# Patient Record
Sex: MALE | Race: WHITE | NOT HISPANIC OR LATINO | Employment: FULL TIME | URBAN - METROPOLITAN AREA
[De-identification: names, ages, dates, MRNs, and addresses within clinical notes are randomized per-mention and may not be internally consistent; named-entity substitution may affect disease eponyms.]

---

## 2019-05-08 ENCOUNTER — OFFICE VISIT (OUTPATIENT)
Dept: FAMILY MEDICINE CLINIC | Facility: CLINIC | Age: 29
End: 2019-05-08

## 2019-05-08 VITALS
DIASTOLIC BLOOD PRESSURE: 88 MMHG | OXYGEN SATURATION: 99 % | SYSTOLIC BLOOD PRESSURE: 148 MMHG | HEART RATE: 82 BPM | HEIGHT: 72 IN | WEIGHT: 254 LBS | BODY MASS INDEX: 34.4 KG/M2

## 2019-05-08 DIAGNOSIS — M54.50 CHRONIC BILATERAL LOW BACK PAIN WITHOUT SCIATICA: Primary | ICD-10-CM

## 2019-05-08 DIAGNOSIS — M43.06 PARS DEFECT OF LUMBAR SPINE: ICD-10-CM

## 2019-05-08 DIAGNOSIS — G89.29 CHRONIC BILATERAL LOW BACK PAIN WITHOUT SCIATICA: Primary | ICD-10-CM

## 2019-05-08 DIAGNOSIS — K64.9 HEMORRHOIDS, UNSPECIFIED HEMORRHOID TYPE: ICD-10-CM

## 2019-05-08 PROCEDURE — 99203 OFFICE O/P NEW LOW 30 MIN: CPT | Performed by: FAMILY MEDICINE

## 2019-05-08 NOTE — PATIENT INSTRUCTIONS
1.  Core exercises--but only flexion, no back extension exercises. Plakns are ideal.    2.  Blood work has been ordered for you today, to be done about a week prior to your next appointment.  You do not need an appointment, but I would advise to call our office a few days before you plan to have your labs done to confirm that orders are in and active.  We will discuss the results at your next scheduled visit.

## 2019-05-08 NOTE — PROGRESS NOTES
Subjective   Rosas Rich is a 28 y.o. male.     Chief Complaint   Patient presents with   • Establish Care       History of Present Illness     Acute complaints:  Rosas Rich is a 28 y.o. male who presents today to establish care.  He was seen at the urgent care yesterday by Magali Pratt for low back pain.  It has been present for 2 weeks, mostly occurs after stretching.  He denies any injury.  The pain is aching and nonradiating.  It seems to be worsening over the past week.  He was prescribed a Medrol Dosepak yesterday, apart from that had not tried any treatments.  It is been much the same all the time and does not seem to be affected by any particular activity or movement. He played football as a  in .    His blood pressure was significantly elevated 152/97 at his visit yesterday, he had significant pain obviously at the time.  He denies any history of hypertension, although he does report a family history of same.  He has a history of obesity with a BMI maintained over 33.    The following portions of the patient's history were reviewed and updated as appropriate: allergies, current medications, past family history, past medical history, past social history, past surgical history and problem list.    Active Ambulatory Problems     Diagnosis Date Noted   • No Active Ambulatory Problems     Resolved Ambulatory Problems     Diagnosis Date Noted   • No Resolved Ambulatory Problems     No Additional Past Medical History     Past Surgical History:   Procedure Laterality Date   • KNEE ARTHROSCOPY       Family History   Problem Relation Age of Onset   • Hypertension Father    • Heart attack Paternal Grandfather      Social History     Socioeconomic History   • Marital status:      Spouse name: Not on file   • Number of children: Not on file   • Years of education: Not on file   • Highest education level: Not on file   Tobacco Use   • Smoking status: Never Smoker   • Smokeless tobacco: Former User  "  Substance and Sexual Activity   • Alcohol use: Yes     Frequency: Never     Comment: seldom   • Drug use: No   • Sexual activity: Defer         Review of Systems   Constitutional: Negative.    Respiratory: Negative.    Cardiovascular: Negative.    Gastrointestinal: Negative.    Musculoskeletal: Positive for back pain. Negative for gait problem.   Neurological: Negative.        Objective   Blood pressure 148/88, pulse 82, height 182.9 cm (72.01\"), weight 115 kg (254 lb), SpO2 99 %.  Nursing note reviewed  Physical Exam   Constitutional: He appears well-developed and well-nourished.   HENT:   Head: Normocephalic and atraumatic.   Eyes: EOM are normal. Pupils are equal, round, and reactive to light.   Neck: Normal range of motion. Neck supple.   Abdominal: Soft. Bowel sounds are normal. He exhibits no distension.   Musculoskeletal:        Lumbar back: He exhibits decreased range of motion, tenderness and spasm. He exhibits no bony tenderness, no swelling, no edema, no deformity and no laceration.   Nursing note and vitals reviewed.    Procedures  Assessment/Plan   Rosas was seen today for establish care.    Diagnoses and all orders for this visit:    Chronic bilateral low back pain without sciatica  -     MRI Lumbar Spine Without Contrast; Future    Hemorrhoids, unspecified hemorrhoid type    Pars defect of lumbar spine  -     MRI Lumbar Spine Without Contrast; Future        #1  Acute low back pain  Stretches reviewed with the patient, I would recommend physical therapy focusing on flexion- likely pars defect chronic spondy  - MRI ordered today  He should finish his Medrol Dosepak  Weight loss will help his overall mechanics as well routine core exercise and strengthening    #2  Elevated blood pressure  Would recommend observation once he is out of acute pain    #3  Obesity  Lifelong, recommend significant dietary changes and exercise, at least 10,000 steps per day    We will plan to obtain previous records both " for chronic preventative care as well as those related to the current episode of care.  Any records that the patient brought with him today were reviewed personally by me during the visit today and will be scanned into the chart for posterity.    Patient Instructions   1.  Core exercises--but only flexion, no back extension exercises. Plakns are ideal.    2.  Blood work has been ordered for you today, to be done about a week prior to your next appointment.  You do not need an appointment, but I would advise to call our office a few days before you plan to have your labs done to confirm that orders are in and active.  We will discuss the results at your next scheduled visit.      Return in about 6 months (around 11/8/2019) for Annual, (fasting blood work 1 week prior to appt).    Ambulatory progress note signed and attested to by Sanchez Gamino D.O. on 5/15/2019 at 07:32.

## 2019-05-15 ENCOUNTER — TELEPHONE (OUTPATIENT)
Dept: FAMILY MEDICINE CLINIC | Facility: CLINIC | Age: 29
End: 2019-05-15

## 2019-05-15 PROBLEM — G89.29 CHRONIC BILATERAL LOW BACK PAIN WITHOUT SCIATICA: Status: ACTIVE | Noted: 2019-05-15

## 2019-05-15 PROBLEM — K64.9 HEMORRHOIDS: Status: ACTIVE | Noted: 2019-05-15

## 2019-05-15 PROBLEM — M54.50 CHRONIC BILATERAL LOW BACK PAIN WITHOUT SCIATICA: Status: ACTIVE | Noted: 2019-05-15

## 2019-05-15 PROBLEM — M43.06 PARS DEFECT OF LUMBAR SPINE: Status: ACTIVE | Noted: 2019-05-15

## 2019-05-15 NOTE — TELEPHONE ENCOUNTER
PT IS HAVING PROBLEMS WITH HIS SPINE AND WANTS TO ASK IF THE PROBLEM THAT HE IS HAVING IS NORMAL. WOULD NOT PROVIDE ME WITH ANY DETAILS.

## 2019-10-08 ENCOUNTER — TELEPHONE (OUTPATIENT)
Dept: FAMILY MEDICINE CLINIC | Facility: CLINIC | Age: 29
End: 2019-10-08

## 2019-10-08 NOTE — TELEPHONE ENCOUNTER
Pt called and wants us to know that he is getting his labs done that Stevenson Lopez ordered. Pt is having concerns was told at his last visit in may that he has hemmroids, wanted to make sure that's exactly what they are, what can make them worse, etc. Should he move the MRI up for this concern.

## 2019-10-09 ENCOUNTER — OFFICE VISIT (OUTPATIENT)
Dept: FAMILY MEDICINE CLINIC | Facility: CLINIC | Age: 29
End: 2019-10-09

## 2019-10-09 ENCOUNTER — TELEPHONE (OUTPATIENT)
Dept: FAMILY MEDICINE CLINIC | Facility: CLINIC | Age: 29
End: 2019-10-09

## 2019-10-09 VITALS
WEIGHT: 264 LBS | OXYGEN SATURATION: 98 % | HEIGHT: 72 IN | SYSTOLIC BLOOD PRESSURE: 130 MMHG | BODY MASS INDEX: 35.76 KG/M2 | HEART RATE: 82 BPM | DIASTOLIC BLOOD PRESSURE: 80 MMHG

## 2019-10-09 DIAGNOSIS — K64.9 HEMORRHOIDS, UNSPECIFIED HEMORRHOID TYPE: Primary | ICD-10-CM

## 2019-10-09 DIAGNOSIS — G89.29 CHRONIC BILATERAL LOW BACK PAIN WITHOUT SCIATICA: ICD-10-CM

## 2019-10-09 DIAGNOSIS — M54.50 CHRONIC BILATERAL LOW BACK PAIN WITHOUT SCIATICA: ICD-10-CM

## 2019-10-09 DIAGNOSIS — M43.06 PARS DEFECT OF LUMBAR SPINE: ICD-10-CM

## 2019-10-09 PROCEDURE — 99214 OFFICE O/P EST MOD 30 MIN: CPT | Performed by: FAMILY MEDICINE

## 2019-10-09 RX ORDER — DIAPER,BRIEF,INFANT-TODD,DISP
EACH MISCELLANEOUS 2 TIMES DAILY
Qty: 120 G | Refills: 0 | Status: SHIPPED | OUTPATIENT
Start: 2019-10-09

## 2019-10-09 NOTE — PATIENT INSTRUCTIONS
Hemorrhoids    Hemorrhoids are swollen veins in and around the rectum or anus. Hemorrhoids can cause pain, itching, or bleeding. Most of the time, they do not cause serious problems. They usually get better with diet changes, lifestyle changes, and other home treatments.  Follow these instructions at home:  Eating and drinking  · Eat foods that have fiber, such as whole grains, beans, nuts, fruits, and vegetables. Ask your doctor about taking products that have added fiber (fiber supplements).  · Drink enough fluid to keep your pee (urine) clear or pale yellow.  For Pain and Swelling  · Take a warm-water bath (sitz bath) for 20 minutes to ease pain. Do this 3-4 times a day.  · If directed, put ice on the painful area. It may be helpful to use ice between your warm baths.  ? Put ice in a plastic bag.  ? Place a towel between your skin and the bag.  ? Leave the ice on for 20 minutes, 2-3 times a day.  General instructions  · Take over-the-counter and prescription medicines only as told by your doctor.  ? Medicated creams and medicines that are inserted into the anus (suppositories) may be used or applied as told.  · Exercise often.  · Go to the bathroom when you have the urge to poop (to have a bowel movement). Do not wait.  · Avoid pushing too hard (straining) when you poop.  · Keep the butt area dry and clean. Use wet toilet paper or moist paper towels.  · Do not sit on the toilet for a long time.  Contact a doctor if:  · You have any of these:  ? Pain and swelling that do not get better with treatment or medicine.  ? Bleeding that will not stop.  ? Trouble pooping or you cannot poop.  ? Pain or swelling outside the area of the hemorrhoids.  This information is not intended to replace advice given to you by your health care provider. Make sure you discuss any questions you have with your health care provider.  Document Released: 09/26/2009 Document Revised: 05/25/2017 Document Reviewed: 08/31/2016  [employer]  Interactive Patient Education © 2019 [employer]

## 2019-10-09 NOTE — PROGRESS NOTES
Subjective   Rosas Rich is a 29 y.o. male.     Chief Complaint   Patient presents with   • Follow-up       History of Present Illness     Rosas Rich presents today for   Chief Complaint   Patient presents with   • Follow-up     He was seen in May for back pain.  At that point it is been present for 2 weeks.  It mostly occurs after stretching, but has been intermittent. He denies any injury.  The pain is aching and nonradiating.  It seems to be worsening over the past week.  He was prescribed a Medrol Dosepak yesterday, apart from that had not tried any treatments.  It is been much the same all the time and does not seem to be affected by any particular activity or movement. He played football as a  in .    He has mild hemorrhoids, they respond well to Preparation H wipes, but when he does not use the wipes his symptoms return.    This patient is accompanied by their self who contributes to the history of their care.    The following portions of the patient's history were reviewed and updated as appropriate: allergies, current medications, past family history, past medical history, past social history, past surgical history and problem list.    Active Ambulatory Problems     Diagnosis Date Noted   • Chronic bilateral low back pain without sciatica 05/15/2019   • Hemorrhoids 05/15/2019   • Pars defect of lumbar spine 05/15/2019     Resolved Ambulatory Problems     Diagnosis Date Noted   • No Resolved Ambulatory Problems     No Additional Past Medical History     Past Surgical History:   Procedure Laterality Date   • KNEE ARTHROSCOPY       Family History   Problem Relation Age of Onset   • Hypertension Father    • Heart attack Paternal Grandfather      Social History     Socioeconomic History   • Marital status:      Spouse name: Not on file   • Number of children: Not on file   • Years of education: Not on file   • Highest education level: Not on file   Tobacco Use   • Smoking status: Never  "Smoker   • Smokeless tobacco: Former User   Substance and Sexual Activity   • Alcohol use: Yes     Frequency: Never     Comment: seldom   • Drug use: No   • Sexual activity: Defer         Review of Systems   Constitutional: Negative.    Respiratory: Negative.    Cardiovascular: Negative.    Gastrointestinal: Positive for constipation and rectal pain. Negative for abdominal distention and abdominal pain.       Objective   Blood pressure 130/80, pulse 82, height 182.9 cm (72.01\"), weight 120 kg (264 lb), SpO2 98 %.  Nursing note reviewed  Physical Exam  Const: NAD, A&Ox4, Pleasant, Cooperative  Eyes: EOMI, no conjunctivitis  ENT: No nasal discharge present, neck supple  Cardiac: Regular rate and rhythm, no cyanosis  Resp: Respiratory rate within normal limits, no increased work of breathing, no audible wheezing or retractions noted  GI: No distention or ascites  MSK: Motor and sensation grossly intact in bilateral upper extremities  Neurologic: CN II-XII grossly intact  Psych: Appropriate mood and behavior.  Skin: Pink, warm, dry  Procedures  Assessment/Plan     Problem List Items Addressed This Visit        Cardiovascular and Mediastinum    Hemorrhoids - Primary    Relevant Medications    hydrocortisone 1 % cream    docusate sodium (COLACE) 50 MG capsule    Other Relevant Orders    Ambulatory Referral For Screening Colonoscopy (Completed)       Nervous and Auditory    Chronic bilateral low back pain without sciatica    Relevant Orders    MRI Lumbar Spine Without Contrast       Musculoskeletal and Integument    Pars defect of lumbar spine    Relevant Orders    MRI Lumbar Spine Without Contrast        #1 low back pain  Previous x-ray, he has tried doing stretching at home but his work schedule prohibits this somewhat  He previously had an MRI scheduled, but wanted to hold off on this until after further discussion    #2 hemorrhoids  Classical symptoms, no significant red flags.  I have advised him to use treatment " regularly, particularly improvements in diet, exercise, water intake.  He reports that he is still concerned and his wife asked him to see a colorectal surgeon and have it checked out a referral placed today.    Patient Instructions   Hemorrhoids    Hemorrhoids are swollen veins in and around the rectum or anus. Hemorrhoids can cause pain, itching, or bleeding. Most of the time, they do not cause serious problems. They usually get better with diet changes, lifestyle changes, and other home treatments.  Follow these instructions at home:  Eating and drinking  · Eat foods that have fiber, such as whole grains, beans, nuts, fruits, and vegetables. Ask your doctor about taking products that have added fiber (fiber supplements).  · Drink enough fluid to keep your pee (urine) clear or pale yellow.  For Pain and Swelling  · Take a warm-water bath (sitz bath) for 20 minutes to ease pain. Do this 3-4 times a day.  · If directed, put ice on the painful area. It may be helpful to use ice between your warm baths.  ? Put ice in a plastic bag.  ? Place a towel between your skin and the bag.  ? Leave the ice on for 20 minutes, 2-3 times a day.  General instructions  · Take over-the-counter and prescription medicines only as told by your doctor.  ? Medicated creams and medicines that are inserted into the anus (suppositories) may be used or applied as told.  · Exercise often.  · Go to the bathroom when you have the urge to poop (to have a bowel movement). Do not wait.  · Avoid pushing too hard (straining) when you poop.  · Keep the butt area dry and clean. Use wet toilet paper or moist paper towels.  · Do not sit on the toilet for a long time.  Contact a doctor if:  · You have any of these:  ? Pain and swelling that do not get better with treatment or medicine.  ? Bleeding that will not stop.  ? Trouble pooping or you cannot poop.  ? Pain or swelling outside the area of the hemorrhoids.  This information is not intended to replace  advice given to you by your health care provider. Make sure you discuss any questions you have with your health care provider.  Document Released: 09/26/2009 Document Revised: 05/25/2017 Document Reviewed: 08/31/2016  [employer] Interactive Patient Education © 2019 [employer]        Return for follow up after testing/imaging.    Ambulatory progress note signed and attested to by Sanchez Gamino D.O.

## 2019-10-09 NOTE — TELEPHONE ENCOUNTER
Called and informed pharmacy of new dose. They verbalized understanding and had no further questions.

## 2019-10-11 ENCOUNTER — APPOINTMENT (OUTPATIENT)
Dept: MRI IMAGING | Facility: HOSPITAL | Age: 29
End: 2019-10-11

## 2019-10-16 ENCOUNTER — HOSPITAL ENCOUNTER (OUTPATIENT)
Dept: MRI IMAGING | Facility: HOSPITAL | Age: 29
Discharge: HOME | End: 2019-10-16
Admitting: FAMILY MEDICINE

## 2019-10-16 DIAGNOSIS — G89.29 CHRONIC BILATERAL LOW BACK PAIN WITHOUT SCIATICA: ICD-10-CM

## 2019-10-16 DIAGNOSIS — M43.06 PARS DEFECT OF LUMBAR SPINE: ICD-10-CM

## 2019-10-16 DIAGNOSIS — M54.50 CHRONIC BILATERAL LOW BACK PAIN WITHOUT SCIATICA: ICD-10-CM

## 2019-10-16 PROCEDURE — 72148 MRI LUMBAR SPINE W/O DYE: CPT

## 2019-10-31 ENCOUNTER — OFFICE VISIT (OUTPATIENT)
Dept: FAMILY MEDICINE CLINIC | Facility: CLINIC | Age: 29
End: 2019-10-31

## 2019-10-31 VITALS
DIASTOLIC BLOOD PRESSURE: 80 MMHG | WEIGHT: 256 LBS | SYSTOLIC BLOOD PRESSURE: 132 MMHG | OXYGEN SATURATION: 99 % | HEIGHT: 72 IN | BODY MASS INDEX: 34.67 KG/M2 | HEART RATE: 76 BPM

## 2019-10-31 DIAGNOSIS — M43.06 PARS DEFECT OF LUMBAR SPINE: ICD-10-CM

## 2019-10-31 DIAGNOSIS — M43.10 ANTEROLISTHESIS: ICD-10-CM

## 2019-10-31 DIAGNOSIS — M54.50 CHRONIC BILATERAL LOW BACK PAIN WITHOUT SCIATICA: Primary | ICD-10-CM

## 2019-10-31 DIAGNOSIS — G89.29 CHRONIC BILATERAL LOW BACK PAIN WITHOUT SCIATICA: Primary | ICD-10-CM

## 2019-10-31 PROCEDURE — 99214 OFFICE O/P EST MOD 30 MIN: CPT | Performed by: FAMILY MEDICINE

## 2019-10-31 RX ORDER — MELOXICAM 15 MG/1
15 TABLET ORAL DAILY
Qty: 21 TABLET | Refills: 0 | Status: SHIPPED | OUTPATIENT
Start: 2019-10-31 | End: 2019-11-21

## 2019-11-11 ENCOUNTER — TELEPHONE (OUTPATIENT)
Dept: FAMILY MEDICINE CLINIC | Facility: CLINIC | Age: 29
End: 2019-11-11

## 2019-11-11 NOTE — TELEPHONE ENCOUNTER
[facility] called: needs all of pt's radiology reports sent to them.  Fax [phone]  Phone  [phone]

## 2019-11-25 NOTE — PROGRESS NOTES
Subjective   Rosas Rich is a 29 y.o. male.     Chief Complaint   Patient presents with   • Follow-up     MRI results        History of Present Illness     Rosas Rich presents today for   Chief Complaint   Patient presents with   • Follow-up     MRI results      He is here today to follow-up and discuss his MRI results.  He has chronic bilateral low back pain.  MRI was reviewed with him today at the point of care, showed some slight anterolisthesis,.  His pain has continued, mostly mild, but worsens with activity.    This patient is accompanied by their self who contributes to the history of their care.    The following portions of the patient's history were reviewed and updated as appropriate: allergies, current medications, past family history, past medical history, past social history, past surgical history and problem list.    Active Ambulatory Problems     Diagnosis Date Noted   • Chronic bilateral low back pain without sciatica 05/15/2019   • Hemorrhoids 05/15/2019   • Pars defect of lumbar spine 05/15/2019     Resolved Ambulatory Problems     Diagnosis Date Noted   • No Resolved Ambulatory Problems     No Additional Past Medical History     Past Surgical History:   Procedure Laterality Date   • KNEE ARTHROSCOPY       Family History   Problem Relation Age of Onset   • Hypertension Father    • Heart attack Paternal Grandfather      Social History     Socioeconomic History   • Marital status:      Spouse name: Not on file   • Number of children: Not on file   • Years of education: Not on file   • Highest education level: Not on file   Tobacco Use   • Smoking status: Never Smoker   • Smokeless tobacco: Former User   Substance and Sexual Activity   • Alcohol use: Yes     Frequency: Never     Comment: seldom   • Drug use: No   • Sexual activity: Defer       Review of Systems  Review of Systems -  General ROS: negative for - chills, fever or night sweats  Cardiovascular ROS: no chest pain or dyspnea on  "exertion  Gastrointestinal ROS: no abdominal pain, change in bowel habits, or black or bloody stools  Genito-Urinary ROS: no trouble voiding or gross hematuria    Objective   Blood pressure 132/80, pulse 76, height 182.9 cm (72.01\"), weight 116 kg (256 lb), SpO2 99 %.  Nursing note reviewed  Physical Exam  Const: NAD, A&Ox4, Pleasant, Cooperative  Eyes: EOMI, no conjunctivitis  ENT: No nasal discharge present, neck supple  Cardiac: Regular rate and rhythm, no cyanosis  Resp: Respiratory rate within normal limits, no increased work of breathing, no audible wheezing or retractions noted  GI: No distention or ascites  Neurologic: CN II-XII grossly intact  Psych: Appropriate mood and behavior.  Skin: Warm, dry  Procedures   No radiology results for the last 30 days.  Assessment/Plan     Problem List Items Addressed This Visit        Nervous and Auditory    Chronic bilateral low back pain without sciatica - Primary    Relevant Orders    Ambulatory Referral to Physical Therapy Evaluate and treat       Musculoskeletal and Integument    Pars defect of lumbar spine    Relevant Orders    Ambulatory Referral to Physical Therapy Evaluate and treat      Other Visit Diagnoses     Anterolisthesis        Relevant Orders    Ambulatory Referral to Physical Therapy Evaluate and treat        #1 chronic bilateral low back pain  Referral to physical therapy placed today  Recommended meloxicam for the next 6 weeks    See patient diagnoses and orders along with patient instructions for assessment, plan, and changes to care for patient.    Patient Instructions   1.  Core exercises--but only flexion, no back extension exercises. Planks are ideal.      Return in about 6 weeks (around 12/12/2019).    Ambulatory progress note signed and attested to by Sanchez Gamino D.O.         "

## 2020-08-03 ENCOUNTER — TELEPHONE (OUTPATIENT)
Dept: FAMILY MEDICINE CLINIC | Facility: CLINIC | Age: 30
End: 2020-08-03

## 2020-08-03 NOTE — TELEPHONE ENCOUNTER
He had an appointment set up for him on 11/11/2019 with Dr. Miller for colonoscopy. If he missed this I would recommend calling [facility] to see if it can be rescheduled. If not I can put a new referral in.

## 2020-08-03 NOTE — TELEPHONE ENCOUNTER
Patient states that he was to be referred to a Gastroenterologist last year, but didn't make his appt and wondered if Dr. Gamino could put in another referral.  He can be reached at [phone]

## 2020-10-12 ENCOUNTER — OFFICE VISIT (OUTPATIENT)
Dept: FAMILY MEDICINE CLINIC | Facility: CLINIC | Age: 30
End: 2020-10-12

## 2020-10-12 VITALS
WEIGHT: 250 LBS | HEART RATE: 95 BPM | OXYGEN SATURATION: 98 % | BODY MASS INDEX: 33.86 KG/M2 | HEIGHT: 72 IN | SYSTOLIC BLOOD PRESSURE: 134 MMHG | DIASTOLIC BLOOD PRESSURE: 88 MMHG

## 2020-10-12 DIAGNOSIS — H61.23 BILATERAL IMPACTED CERUMEN: ICD-10-CM

## 2020-10-12 DIAGNOSIS — H93.8X2 SENSATION OF FULLNESS IN LEFT EAR: Primary | ICD-10-CM

## 2020-10-12 PROCEDURE — 99213 OFFICE O/P EST LOW 20 MIN: CPT | Performed by: FAMILY MEDICINE

## 2020-10-12 PROCEDURE — 69209 REMOVE IMPACTED EAR WAX UNI: CPT | Performed by: FAMILY MEDICINE

## 2020-10-12 NOTE — PROGRESS NOTES
Subjective   Rosas Rich is a 30 y.o. male.     Chief Complaint   Patient presents with   • Ear Fullness     left ear        History of Present Illness     Rosas Rich presents today for   Chief Complaint   Patient presents with   • Ear Fullness     left ear      He presents today for fullness of the left ear with decreased hearing.  He denies pain.  He typically has his ears cleaned out once a year.    This patient is accompanied by their self who contributes to the history of their care.    The following portions of the patient's history were reviewed and updated as appropriate: allergies, current medications, past family history, past medical history, past social history, past surgical history and problem list.    Active Ambulatory Problems     Diagnosis Date Noted   • Chronic bilateral low back pain without sciatica 05/15/2019   • Hemorrhoids 05/15/2019   • Pars defect of lumbar spine 05/15/2019     Resolved Ambulatory Problems     Diagnosis Date Noted   • No Resolved Ambulatory Problems     No Additional Past Medical History     Past Surgical History:   Procedure Laterality Date   • KNEE ARTHROSCOPY       Family History   Problem Relation Age of Onset   • Hypertension Father    • Heart attack Paternal Grandfather      Social History     Socioeconomic History   • Marital status:      Spouse name: Not on file   • Number of children: Not on file   • Years of education: Not on file   • Highest education level: Not on file   Tobacco Use   • Smoking status: Never Smoker   • Smokeless tobacco: Former User   Substance and Sexual Activity   • Alcohol use: Yes     Frequency: Never     Comment: seldom   • Drug use: No   • Sexual activity: Defer       Review of Systems  Review of Systems -  General ROS: negative for - chills, fever or night sweats  Cardiovascular ROS: no chest pain or dyspnea on exertion  Gastrointestinal ROS: no abdominal pain, change in bowel habits, or black or bloody stools  Genito-Urinary  "ROS: no dysuria, trouble voiding, or hematuria    Objective   Blood pressure 134/88, pulse 95, height 182.9 cm (72.01\"), weight 113 kg (250 lb), SpO2 98 %.  Nursing note reviewed  Physical Exam  Const: NAD, A&Ox4, Pleasant, Cooperative  Eyes: EOMI, no conjunctivitis  ENT: No nasal discharge present, neck supple.  Right ear TM completely obscured by cerumen impaction.  Left ear completely obscured TM with cerumen impaction, manual cerumen removal with instrument unsuccessful.  Ear Cerumen Removal    Date/Time: 10/12/2020 3:53 PM  Performed by: Sanchez Gamino DO  Authorized by: Sanchez Gamino DO     Anesthesia:  Local Anesthetic: none  Location details: left ear and right ear  Patient tolerance: patient tolerated the procedure well with no immediate complications  Procedure type: irrigation       Assessment/Plan   Problem List Items Addressed This Visit     None      Visit Diagnoses     Sensation of fullness in left ear    -  Primary    Relevant Medications    carbamide peroxide (Debrox) 6.5 % otic solution    Other Relevant Orders    Ear Cerumen Removal    Ambulatory Referral to ENT (Otolaryngology)    Bilateral impacted cerumen        Relevant Medications    carbamide peroxide (Debrox) 6.5 % otic solution    Other Relevant Orders    Ear Cerumen Removal    Ambulatory Referral to ENT (Otolaryngology)        Unsuccessful cerumen disimpaction on the left    See patient diagnoses and orders along with patient instructions for assessment, plan, and changes to care for patient.    Patient Instructions   Earwax Buildup, Adult  The ears produce a substance called earwax that helps keep bacteria out of the ear and protects the skin in the ear canal. Occasionally, earwax can build up in the ear and cause discomfort or hearing loss.  What increases the risk?  This condition is more likely to develop in people who:  · Are male.  · Are elderly.  · Naturally produce more earwax.  · Clean their ears often with cotton " swabs.  · Use earplugs often.  · Use in-ear headphones often.  · Wear hearing aids.  · Have narrow ear canals.  · Have earwax that is overly thick or sticky.  · Have eczema.  · Are dehydrated.  · Have excess hair in the ear canal.  What are the signs or symptoms?  Symptoms of this condition include:  · Reduced or muffled hearing.  · A feeling of fullness in the ear or feeling that the ear is plugged.  · Fluid coming from the ear.  · Ear pain.  · Ear itch.  · Ringing in the ear.  · Coughing.  · An obvious piece of earwax that can be seen inside the ear canal.  How is this diagnosed?  This condition may be diagnosed based on:  · Your symptoms.  · Your medical history.  · An ear exam. During the exam, your health care provider will look into your ear with an instrument called an otoscope.  You may have tests, including a hearing test.  How is this treated?  This condition may be treated by:  · Using ear drops to soften the earwax.  · Having the earwax removed by a health care provider. The health care provider may:  ? Flush the ear with water.  ? Use an instrument that has a loop on the end (curette).  ? Use a suction device.  · Surgery to remove the wax buildup. This may be done in severe cases.  Follow these instructions at home:    · Take over-the-counter and prescription medicines only as told by your health care provider.  · Do not put any objects, including cotton swabs, into your ear. You can clean the opening of your ear canal with a washcloth or facial tissue.  · Follow instructions from your health care provider about cleaning your ears. Do not over-clean your ears.  · Drink enough fluid to keep your urine clear or pale yellow. This will help to thin the earwax.  · Keep all follow-up visits as told by your health care provider. If earwax builds up in your ears often or if you use hearing aids, consider seeing your health care provider for routine, preventive ear cleanings. Ask your health care provider how  often you should schedule your cleanings.  · If you have hearing aids, clean them according to instructions from the  and your health care provider.  Contact a health care provider if:  · You have ear pain.  · You develop a fever.  · You have blood, pus, or other fluid coming from your ear.  · You have hearing loss.  · You have ringing in your ears that does not go away.  · Your symptoms do not improve with treatment.  · You feel like the room is spinning (vertigo).  Summary  · Earwax can build up in the ear and cause discomfort or hearing loss.  · The most common symptoms of this condition include reduced or muffled hearing and a feeling of fullness in the ear or feeling that the ear is plugged.  · This condition may be diagnosed based on your symptoms, your medical history, and an ear exam.  · This condition may be treated by using ear drops to soften the earwax or by having the earwax removed by a health care provider.  · Do not put any objects, including cotton swabs, into your ear. You can clean the opening of your ear canal with a washcloth or facial tissue.  This information is not intended to replace advice given to you by your health care provider. Make sure you discuss any questions you have with your health care provider.  Document Released: 01/25/2006 Document Revised: 11/30/2018 Document Reviewed: 02/28/2018  [employer] © 2020 [employer]        Return if symptoms worsen or fail to improve.    Ambulatory progress note signed and attested to by Sanchez Gamino D.O.

## 2020-10-13 ENCOUNTER — TELEPHONE (OUTPATIENT)
Dept: FAMILY MEDICINE CLINIC | Facility: CLINIC | Age: 30
End: 2020-10-13

## 2020-10-13 NOTE — ADDENDUM NOTE
Addended by: RYAN OLIVA on: 10/13/2020 10:22 AM     Modules accepted: Level of Service, SmartSet

## 2021-08-06 ENCOUNTER — TELEPHONE (OUTPATIENT)
Dept: FAMILY MEDICINE CLINIC | Facility: CLINIC | Age: 31
End: 2021-08-06

## 2021-08-06 NOTE — TELEPHONE ENCOUNTER
Contacted patient to schedule an appointment, patient states he doesn't have any time to take for the next 3 weeks. I informed him that he would need an office visit to evaluate the rash and if he couldn't schedule an appointment he could go to urgent care. Pt did not want to go to urgent care. Pt is now scheduled for a visit with MADELEINE Burris on 8/10/2021. Pt verbalized understanding and did not have any additional questions at this time.

## 2021-08-06 NOTE — TELEPHONE ENCOUNTER
Caller: Rosas Rich    Relationship: Self    Best call back number:     [phone]    What medication are you requesting:     PRESCRIPTION MEDICATION FOR SEVERE POISON IVY     What are your current symptoms:     RED, ITCHING RASH COVERING BOTH ARMS    How long have you been experiencing symptoms:     4 DAYS    Have you had these symptoms before:    [x] Yes  [] No    Have you been treated for these symptoms before:   [] Yes  [x] No    If a prescription is needed, what is your preferred pharmacy and phone number:      [place], [place]    TELEPHONE CONTACT:    [phone]    Additional notes:    N/A    DR FARRAR